# Patient Record
Sex: FEMALE | Race: WHITE | Employment: FULL TIME | URBAN - METROPOLITAN AREA
[De-identification: names, ages, dates, MRNs, and addresses within clinical notes are randomized per-mention and may not be internally consistent; named-entity substitution may affect disease eponyms.]

---

## 2017-03-13 PROBLEM — E78.01 FAMILIAL HYPERCHOLESTEROLEMIA: Status: ACTIVE | Noted: 2017-03-13

## 2017-03-14 PROCEDURE — 86735 MUMPS ANTIBODY: CPT | Performed by: PHYSICIAN ASSISTANT

## 2017-03-14 PROCEDURE — 86480 TB TEST CELL IMMUN MEASURE: CPT | Performed by: PHYSICIAN ASSISTANT

## 2017-03-14 PROCEDURE — 86787 VARICELLA-ZOSTER ANTIBODY: CPT | Performed by: PHYSICIAN ASSISTANT

## 2017-03-14 PROCEDURE — 86762 RUBELLA ANTIBODY: CPT | Performed by: PHYSICIAN ASSISTANT

## 2017-03-14 PROCEDURE — 86765 RUBEOLA ANTIBODY: CPT | Performed by: PHYSICIAN ASSISTANT

## 2019-03-18 PROCEDURE — 88175 CYTOPATH C/V AUTO FLUID REDO: CPT | Performed by: PHYSICIAN ASSISTANT

## 2019-03-18 PROCEDURE — 87624 HPV HI-RISK TYP POOLED RSLT: CPT | Performed by: PHYSICIAN ASSISTANT

## 2019-04-09 PROCEDURE — 88305 TISSUE EXAM BY PATHOLOGIST: CPT | Performed by: RADIOLOGY

## 2019-04-09 PROCEDURE — 88342 IMHCHEM/IMCYTCHM 1ST ANTB: CPT | Performed by: RADIOLOGY

## 2019-04-09 PROCEDURE — 88341 IMHCHEM/IMCYTCHM EA ADD ANTB: CPT | Performed by: RADIOLOGY

## 2019-04-15 PROCEDURE — 88173 CYTOPATH EVAL FNA REPORT: CPT | Performed by: SURGERY

## 2019-05-06 PROCEDURE — 88173 CYTOPATH EVAL FNA REPORT: CPT | Performed by: SURGERY

## 2021-02-22 PROBLEM — D64.9 ANEMIA, UNSPECIFIED TYPE: Status: ACTIVE | Noted: 2021-02-22

## 2021-02-22 PROBLEM — E78.2 ELEVATED TRIGLYCERIDES WITH HIGH CHOLESTEROL: Status: ACTIVE | Noted: 2021-02-22

## 2021-02-22 PROBLEM — E55.9 VITAMIN D DEFICIENCY: Status: ACTIVE | Noted: 2021-02-22

## 2021-02-22 PROBLEM — R73.03 PREDIABETES: Status: ACTIVE | Noted: 2021-02-22

## 2021-03-04 PROBLEM — D50.9 IRON DEFICIENCY ANEMIA: Status: ACTIVE | Noted: 2021-03-04

## 2022-08-20 ENCOUNTER — APPOINTMENT (OUTPATIENT)
Dept: CT IMAGING | Facility: HOSPITAL | Age: 52
End: 2022-08-20
Attending: EMERGENCY MEDICINE
Payer: COMMERCIAL

## 2022-08-20 ENCOUNTER — APPOINTMENT (OUTPATIENT)
Dept: GENERAL RADIOLOGY | Facility: HOSPITAL | Age: 52
End: 2022-08-20
Attending: EMERGENCY MEDICINE
Payer: COMMERCIAL

## 2022-08-20 ENCOUNTER — HOSPITAL ENCOUNTER (EMERGENCY)
Facility: HOSPITAL | Age: 52
Discharge: HOME | End: 2022-08-21
Attending: EMERGENCY MEDICINE
Payer: COMMERCIAL

## 2022-08-20 DIAGNOSIS — B34.9 VIRAL SYNDROME: Primary | ICD-10-CM

## 2022-08-20 LAB
ALBUMIN SERPL-MCNC: 3.5 G/DL (ref 3.4–5)
ALBUMIN/GLOB SERPL: 0.8 {RATIO} (ref 1–2)
ALP LIVER SERPL-CCNC: 70 U/L
ALT SERPL-CCNC: 62 U/L
ANION GAP SERPL CALC-SCNC: 6 MMOL/L (ref 0–18)
AST SERPL-CCNC: 39 U/L (ref 15–37)
BASOPHILS # BLD AUTO: 0.1 X10(3) UL (ref 0–0.2)
BASOPHILS NFR BLD AUTO: 1.1 %
BILIRUB SERPL-MCNC: 0.2 MG/DL (ref 0.1–2)
BILIRUB UR QL STRIP.AUTO: NEGATIVE
BUN BLD-MCNC: 6 MG/DL (ref 7–18)
CALCIUM BLD-MCNC: 8.6 MG/DL (ref 8.5–10.1)
CHLORIDE SERPL-SCNC: 104 MMOL/L (ref 98–112)
CLARITY UR REFRACT.AUTO: CLEAR
CO2 SERPL-SCNC: 26 MMOL/L (ref 21–32)
COLOR UR AUTO: YELLOW
CREAT BLD-MCNC: 0.88 MG/DL
EOSINOPHIL # BLD AUTO: 0.11 X10(3) UL (ref 0–0.7)
EOSINOPHIL NFR BLD AUTO: 1.2 %
ERYTHROCYTE [DISTWIDTH] IN BLOOD BY AUTOMATED COUNT: 15.5 %
GFR SERPLBLD BASED ON 1.73 SQ M-ARVRAT: 80 ML/MIN/1.73M2 (ref 60–?)
GLOBULIN PLAS-MCNC: 4.2 G/DL (ref 2.8–4.4)
GLUCOSE BLD-MCNC: 173 MG/DL (ref 70–99)
GLUCOSE UR STRIP.AUTO-MCNC: NEGATIVE MG/DL
HCT VFR BLD AUTO: 42.2 %
HGB BLD-MCNC: 13.1 G/DL
IMM GRANULOCYTES # BLD AUTO: 0.04 X10(3) UL (ref 0–1)
IMM GRANULOCYTES NFR BLD: 0.4 %
KETONES UR STRIP.AUTO-MCNC: NEGATIVE MG/DL
LEUKOCYTE ESTERASE UR QL STRIP.AUTO: NEGATIVE
LYMPHOCYTES # BLD AUTO: 2.08 X10(3) UL (ref 1–4)
LYMPHOCYTES NFR BLD AUTO: 23.1 %
MCH RBC QN AUTO: 26.6 PG (ref 26–34)
MCHC RBC AUTO-ENTMCNC: 31 G/DL (ref 31–37)
MCV RBC AUTO: 85.8 FL
MONOCYTES # BLD AUTO: 0.56 X10(3) UL (ref 0.1–1)
MONOCYTES NFR BLD AUTO: 6.2 %
NEUTROPHILS # BLD AUTO: 6.1 X10 (3) UL (ref 1.5–7.7)
NEUTROPHILS # BLD AUTO: 6.1 X10(3) UL (ref 1.5–7.7)
NEUTROPHILS NFR BLD AUTO: 68 %
NITRITE UR QL STRIP.AUTO: NEGATIVE
OSMOLALITY SERPL CALC.SUM OF ELEC: 284 MOSM/KG (ref 275–295)
PH UR STRIP.AUTO: 6.5 [PH] (ref 5–8)
PLATELET # BLD AUTO: 396 10(3)UL (ref 150–450)
POTASSIUM SERPL-SCNC: 3.8 MMOL/L (ref 3.5–5.1)
PROCALCITONIN SERPL-MCNC: <0.05 NG/ML (ref ?–0.16)
PROT SERPL-MCNC: 7.7 G/DL (ref 6.4–8.2)
PROT UR STRIP.AUTO-MCNC: NEGATIVE MG/DL
RBC # BLD AUTO: 4.92 X10(6)UL
SARS-COV-2 RNA RESP QL NAA+PROBE: NOT DETECTED
SODIUM SERPL-SCNC: 136 MMOL/L (ref 136–145)
SP GR UR STRIP.AUTO: 1.02 (ref 1–1.03)
UROBILINOGEN UR STRIP.AUTO-MCNC: 0.2 MG/DL
WBC # BLD AUTO: 9 X10(3) UL (ref 4–11)

## 2022-08-20 PROCEDURE — 96374 THER/PROPH/DIAG INJ IV PUSH: CPT

## 2022-08-20 PROCEDURE — 87430 STREP A AG IA: CPT | Performed by: EMERGENCY MEDICINE

## 2022-08-20 PROCEDURE — 96361 HYDRATE IV INFUSION ADD-ON: CPT

## 2022-08-20 PROCEDURE — 81015 MICROSCOPIC EXAM OF URINE: CPT | Performed by: EMERGENCY MEDICINE

## 2022-08-20 PROCEDURE — 80053 COMPREHEN METABOLIC PANEL: CPT | Performed by: EMERGENCY MEDICINE

## 2022-08-20 PROCEDURE — 99285 EMERGENCY DEPT VISIT HI MDM: CPT

## 2022-08-20 PROCEDURE — 87999 UNLISTED MICROBIOLOGY PX: CPT

## 2022-08-20 PROCEDURE — 71045 X-RAY EXAM CHEST 1 VIEW: CPT | Performed by: EMERGENCY MEDICINE

## 2022-08-20 PROCEDURE — 70450 CT HEAD/BRAIN W/O DYE: CPT | Performed by: EMERGENCY MEDICINE

## 2022-08-20 PROCEDURE — 84145 PROCALCITONIN (PCT): CPT | Performed by: EMERGENCY MEDICINE

## 2022-08-20 PROCEDURE — 74177 CT ABD & PELVIS W/CONTRAST: CPT | Performed by: EMERGENCY MEDICINE

## 2022-08-20 PROCEDURE — 85025 COMPLETE CBC W/AUTO DIFF WBC: CPT | Performed by: EMERGENCY MEDICINE

## 2022-08-20 PROCEDURE — 81001 URINALYSIS AUTO W/SCOPE: CPT | Performed by: EMERGENCY MEDICINE

## 2022-08-20 PROCEDURE — 99284 EMERGENCY DEPT VISIT MOD MDM: CPT

## 2022-08-20 PROCEDURE — 0202U NFCT DS 22 TRGT SARS-COV-2: CPT | Performed by: EMERGENCY MEDICINE

## 2022-08-20 RX ORDER — ACETAMINOPHEN 500 MG
1000 TABLET ORAL ONCE
Status: COMPLETED | OUTPATIENT
Start: 2022-08-20 | End: 2022-08-20

## 2022-08-20 RX ORDER — KETOROLAC TROMETHAMINE 30 MG/ML
30 INJECTION, SOLUTION INTRAMUSCULAR; INTRAVENOUS ONCE
Status: COMPLETED | OUTPATIENT
Start: 2022-08-20 | End: 2022-08-20

## 2022-08-20 RX ORDER — IOHEXOL 350 MG/ML
65 INJECTION, SOLUTION INTRAVENOUS
Status: COMPLETED | OUTPATIENT
Start: 2022-08-20 | End: 2022-08-20

## 2022-08-21 VITALS
OXYGEN SATURATION: 98 % | SYSTOLIC BLOOD PRESSURE: 110 MMHG | BODY MASS INDEX: 25 KG/M2 | HEIGHT: 59 IN | HEART RATE: 84 BPM | DIASTOLIC BLOOD PRESSURE: 72 MMHG | RESPIRATION RATE: 20 BRPM | TEMPERATURE: 98 F | WEIGHT: 124 LBS

## 2022-08-21 LAB
ADENOVIRUS PCR:: NOT DETECTED
B PARAPERT DNA SPEC QL NAA+PROBE: NOT DETECTED
B PERT DNA SPEC QL NAA+PROBE: NOT DETECTED
C PNEUM DNA SPEC QL NAA+PROBE: NOT DETECTED
CORONAVIRUS 229E PCR:: NOT DETECTED
CORONAVIRUS HKU1 PCR:: NOT DETECTED
CORONAVIRUS NL63 PCR:: NOT DETECTED
CORONAVIRUS OC43 PCR:: NOT DETECTED
FLUAV RNA SPEC QL NAA+PROBE: NOT DETECTED
FLUBV RNA SPEC QL NAA+PROBE: NOT DETECTED
METAPNEUMOVIRUS PCR:: NOT DETECTED
MYCOPLASMA PNEUMONIA PCR:: NOT DETECTED
PARAINFLUENZA 1 PCR:: NOT DETECTED
PARAINFLUENZA 2 PCR:: NOT DETECTED
PARAINFLUENZA 3 PCR:: NOT DETECTED
PARAINFLUENZA 4 PCR:: NOT DETECTED
RHINOVIRUS/ENTERO PCR:: NOT DETECTED
RSV RNA SPEC QL NAA+PROBE: NOT DETECTED
SARS-COV-2 RNA NPH QL NAA+NON-PROBE: NOT DETECTED

## 2022-08-21 RX ORDER — KETOROLAC TROMETHAMINE 10 MG/1
10 TABLET, FILM COATED ORAL EVERY 6 HOURS PRN
Qty: 20 TABLET | Refills: 0 | Status: SHIPPED | OUTPATIENT
Start: 2022-08-21

## 2022-08-21 RX ORDER — ONDANSETRON 8 MG/1
8 TABLET, ORALLY DISINTEGRATING ORAL EVERY 6 HOURS PRN
Qty: 10 TABLET | Refills: 0 | Status: SHIPPED | OUTPATIENT
Start: 2022-08-21

## 2022-08-21 NOTE — ED INITIAL ASSESSMENT (HPI)
Pt presents to ED with c/o body aches, fever and headache since Tuesday. Pt has taken multiple COVD tests which were negative. Last test was 2 days ago.